# Patient Record
Sex: MALE | Race: WHITE | Employment: PART TIME | URBAN - METROPOLITAN AREA
[De-identification: names, ages, dates, MRNs, and addresses within clinical notes are randomized per-mention and may not be internally consistent; named-entity substitution may affect disease eponyms.]

---

## 2021-06-07 ENCOUNTER — HOSPITAL ENCOUNTER (EMERGENCY)
Age: 36
Discharge: HOME | End: 2021-06-07
Attending: EMERGENCY MEDICINE
Payer: MEDICAID

## 2021-06-07 VITALS
RESPIRATION RATE: 18 BRPM | TEMPERATURE: 98.2 F | SYSTOLIC BLOOD PRESSURE: 108 MMHG | HEIGHT: 66 IN | OXYGEN SATURATION: 98 % | DIASTOLIC BLOOD PRESSURE: 70 MMHG | WEIGHT: 280 LBS | BODY MASS INDEX: 45 KG/M2 | HEART RATE: 77 BPM

## 2021-06-07 DIAGNOSIS — T40.601A NARCOTIC OVERDOSE, ACCIDENTAL OR UNINTENTIONAL, INITIAL ENCOUNTER (HCC): Primary | ICD-10-CM

## 2021-06-07 DIAGNOSIS — T40.601A OPIATE OVERDOSE, ACCIDENTAL OR UNINTENTIONAL, INITIAL ENCOUNTER (HCC): ICD-10-CM

## 2021-06-07 PROCEDURE — 99285 EMERGENCY DEPT VISIT HI MDM: CPT
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
## 2021-06-07 NOTE — ED NOTES
Bed: 20  Expected date:   Expected time:   Means of arrival:   Comments:     Harsh Spears RN  06/07/21 1900

## 2021-06-08 NOTE — ED NOTES
Patient is alert and oriented at this time, watching TV, instructed to pull IV out per Dr Erick Hyman, RN  06/07/21 7675

## 2021-06-08 NOTE — ED NOTES
Pt awake alert skin pink w/d resp non labored. Gait steady on leaving er. Pt looking at phone on the way out of ER. Miranda Francois RN  06/07/21 5341

## 2021-07-31 ENCOUNTER — HOSPITAL ENCOUNTER (EMERGENCY)
Age: 36
Discharge: HOME | End: 2021-07-31
Attending: EMERGENCY MEDICINE
Payer: MEDICAID

## 2021-07-31 VITALS
WEIGHT: 200 LBS | DIASTOLIC BLOOD PRESSURE: 66 MMHG | SYSTOLIC BLOOD PRESSURE: 112 MMHG | RESPIRATION RATE: 16 BRPM | HEIGHT: 66 IN | HEART RATE: 91 BPM | OXYGEN SATURATION: 100 % | BODY MASS INDEX: 32.14 KG/M2 | TEMPERATURE: 98.2 F
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
## 2021-07-31 PROCEDURE — 36415 COLL VENOUS BLD VENIPUNCTURE: CPT

## 2021-07-31 PROCEDURE — 2500000003 HC RX 250 WO HCPCS: Performed by: EMERGENCY MEDICINE

## 2021-07-31 PROCEDURE — 99285 EMERGENCY DEPT VISIT HI MDM: CPT

## 2021-07-31 PROCEDURE — 6370000000 HC RX 637 (ALT 250 FOR IP): Performed by: EMERGENCY MEDICINE

## 2021-07-31 PROCEDURE — 82077 ASSAY SPEC XCP UR&BREATH IA: CPT

## 2021-07-31 PROCEDURE — 6360000002 HC RX W HCPCS: Performed by: EMERGENCY MEDICINE

## 2021-07-31 PROCEDURE — 96374 THER/PROPH/DIAG INJ IV PUSH: CPT

## 2021-07-31 RX ORDER — PROPARACAINE HYDROCHLORIDE 5 MG/ML
2 SOLUTION/ DROPS OPHTHALMIC ONCE
Status: COMPLETED | OUTPATIENT
Start: 2021-07-31 | End: 2021-07-31

## 2021-07-31 RX ORDER — KETOROLAC TROMETHAMINE 30 MG/ML
30 INJECTION, SOLUTION INTRAMUSCULAR; INTRAVENOUS ONCE
Status: COMPLETED | OUTPATIENT
Start: 2021-07-31 | End: 2021-07-31

## 2021-07-31 RX ADMIN — FLUORESCEIN SODIUM 1 MG: 1 STRIP OPHTHALMIC at 22:14

## 2021-07-31 RX ADMIN — KETOROLAC TROMETHAMINE 30 MG: 30 INJECTION, SOLUTION INTRAMUSCULAR; INTRAVENOUS at 22:19

## 2021-07-31 RX ADMIN — PROPARACAINE HYDROCHLORIDE 2 DROP: 5 SOLUTION/ DROPS OPHTHALMIC at 22:14

## 2021-07-31 ASSESSMENT — PAIN DESCRIPTION - DESCRIPTORS: DESCRIPTORS: ACHING

## 2021-07-31 ASSESSMENT — ENCOUNTER SYMPTOMS
ABDOMINAL DISTENTION: 0
EYE DISCHARGE: 0
SORE THROAT: 0
PHOTOPHOBIA: 0
COUGH: 0
ABDOMINAL PAIN: 0
VOMITING: 0
CHEST TIGHTNESS: 0
SHORTNESS OF BREATH: 0
WHEEZING: 0

## 2021-07-31 ASSESSMENT — PAIN DESCRIPTION - LOCATION: LOCATION: HEAD

## 2021-07-31 ASSESSMENT — PAIN DESCRIPTION - FREQUENCY: FREQUENCY: CONTINUOUS

## 2021-07-31 ASSESSMENT — PAIN DESCRIPTION - PAIN TYPE: TYPE: ACUTE PAIN

## 2021-07-31 ASSESSMENT — PAIN SCALES - GENERAL
PAINLEVEL_OUTOF10: 7
PAINLEVEL_OUTOF10: 7

## 2021-08-01 LAB
ETHANOL PERCENT: NORMAL G/DL
ETHANOL: <10 MG/DL (ref 0–0.08)
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
## 2022-03-15 ENCOUNTER — APPOINTMENT (OUTPATIENT)
Dept: GENERAL RADIOLOGY | Age: 37
DRG: 816 | End: 2022-03-15
Payer: MEDICAID

## 2022-03-15 ENCOUNTER — HOSPITAL ENCOUNTER (INPATIENT)
Age: 37
LOS: 1 days | Discharge: LEFT BEFORE DISCHARGE | DRG: 816 | End: 2022-03-16
Attending: STUDENT IN AN ORGANIZED HEALTH CARE EDUCATION/TRAINING PROGRAM | Admitting: INTERNAL MEDICINE
Payer: MEDICAID

## 2022-03-15 ENCOUNTER — APPOINTMENT (OUTPATIENT)
Dept: CT IMAGING | Age: 37
DRG: 816 | End: 2022-03-15
Payer: MEDICAID

## 2022-03-15 DIAGNOSIS — R41.82 ALTERED MENTAL STATUS, UNSPECIFIED ALTERED MENTAL STATUS TYPE: ICD-10-CM

## 2022-03-15 DIAGNOSIS — T40.2X4A OPIOID OVERDOSE, UNDETERMINED INTENT, INITIAL ENCOUNTER (HCC): Primary | ICD-10-CM

## 2022-03-15 LAB
ALBUMIN SERPL-MCNC: 4.2 G/DL (ref 3.5–4.6)
ALP BLD-CCNC: 97 U/L (ref 35–104)
ALT SERPL-CCNC: 18 U/L (ref 0–41)
ANION GAP SERPL CALCULATED.3IONS-SCNC: 14 MEQ/L (ref 9–15)
AST SERPL-CCNC: 28 U/L (ref 0–40)
BASOPHILS ABSOLUTE: 0.1 K/UL (ref 0–0.2)
BASOPHILS RELATIVE PERCENT: 0.6 %
BILIRUB SERPL-MCNC: <0.2 MG/DL (ref 0.2–0.7)
BUN BLDV-MCNC: 11 MG/DL (ref 6–20)
CALCIUM SERPL-MCNC: 9.4 MG/DL (ref 8.5–9.9)
CHLORIDE BLD-SCNC: 102 MEQ/L (ref 95–107)
CHP ED QC CHECK: NORMAL
CO2: 24 MEQ/L (ref 20–31)
CREAT SERPL-MCNC: 1.12 MG/DL (ref 0.7–1.2)
EOSINOPHILS ABSOLUTE: 0.4 K/UL (ref 0–0.7)
EOSINOPHILS RELATIVE PERCENT: 2.8 %
ETHANOL PERCENT: NORMAL G/DL
ETHANOL: <10 MG/DL (ref 0–0.08)
GFR AFRICAN AMERICAN: >60
GFR NON-AFRICAN AMERICAN: >60
GLOBULIN: 3.2 G/DL (ref 2.3–3.5)
GLUCOSE BLD-MCNC: 205 MG/DL (ref 70–99)
GLUCOSE BLD-MCNC: 259 MG/DL
HCT VFR BLD CALC: 38.7 % (ref 42–52)
HEMOGLOBIN: 12.7 G/DL (ref 14–18)
LACTIC ACID: 3.4 MMOL/L (ref 0.5–2.2)
LYMPHOCYTES ABSOLUTE: 1.5 K/UL (ref 1–4.8)
LYMPHOCYTES RELATIVE PERCENT: 11 %
MAGNESIUM: 2.4 MG/DL (ref 1.7–2.4)
MCH RBC QN AUTO: 27.3 PG (ref 27–31.3)
MCHC RBC AUTO-ENTMCNC: 32.7 % (ref 33–37)
MCV RBC AUTO: 83.5 FL (ref 80–100)
MONOCYTES ABSOLUTE: 0.5 K/UL (ref 0.2–0.8)
MONOCYTES RELATIVE PERCENT: 4 %
NEUTROPHILS ABSOLUTE: 10.9 K/UL (ref 1.4–6.5)
NEUTROPHILS RELATIVE PERCENT: 81.6 %
PDW BLD-RTO: 16.1 % (ref 11.5–14.5)
PLATELET # BLD: 360 K/UL (ref 130–400)
POTASSIUM SERPL-SCNC: 4.7 MEQ/L (ref 3.4–4.9)
RBC # BLD: 4.64 M/UL (ref 4.7–6.1)
SODIUM BLD-SCNC: 140 MEQ/L (ref 135–144)
TOTAL CK: 155 U/L (ref 0–190)
TOTAL PROTEIN: 7.4 G/DL (ref 6.3–8)
TROPONIN: <0.01 NG/ML (ref 0–0.01)
TSH REFLEX: 1.58 UIU/ML (ref 0.44–3.86)
WBC # BLD: 13.4 K/UL (ref 4.8–10.8)

## 2022-03-15 PROCEDURE — 99284 EMERGENCY DEPT VISIT MOD MDM: CPT

## 2022-03-15 PROCEDURE — 83735 ASSAY OF MAGNESIUM: CPT

## 2022-03-15 PROCEDURE — 2580000003 HC RX 258: Performed by: STUDENT IN AN ORGANIZED HEALTH CARE EDUCATION/TRAINING PROGRAM

## 2022-03-15 PROCEDURE — 6360000002 HC RX W HCPCS: Performed by: STUDENT IN AN ORGANIZED HEALTH CARE EDUCATION/TRAINING PROGRAM

## 2022-03-15 PROCEDURE — 6360000002 HC RX W HCPCS

## 2022-03-15 PROCEDURE — 85025 COMPLETE CBC W/AUTO DIFF WBC: CPT

## 2022-03-15 PROCEDURE — 84443 ASSAY THYROID STIM HORMONE: CPT

## 2022-03-15 PROCEDURE — 82077 ASSAY SPEC XCP UR&BREATH IA: CPT

## 2022-03-15 PROCEDURE — 82550 ASSAY OF CK (CPK): CPT

## 2022-03-15 PROCEDURE — 80053 COMPREHEN METABOLIC PANEL: CPT

## 2022-03-15 PROCEDURE — 70450 CT HEAD/BRAIN W/O DYE: CPT

## 2022-03-15 PROCEDURE — 83605 ASSAY OF LACTIC ACID: CPT

## 2022-03-15 PROCEDURE — 93005 ELECTROCARDIOGRAM TRACING: CPT | Performed by: STUDENT IN AN ORGANIZED HEALTH CARE EDUCATION/TRAINING PROGRAM

## 2022-03-15 PROCEDURE — 96365 THER/PROPH/DIAG IV INF INIT: CPT

## 2022-03-15 PROCEDURE — 36415 COLL VENOUS BLD VENIPUNCTURE: CPT

## 2022-03-15 PROCEDURE — 71045 X-RAY EXAM CHEST 1 VIEW: CPT

## 2022-03-15 PROCEDURE — 84484 ASSAY OF TROPONIN QUANT: CPT

## 2022-03-15 PROCEDURE — 96366 THER/PROPH/DIAG IV INF ADDON: CPT

## 2022-03-15 PROCEDURE — 80307 DRUG TEST PRSMV CHEM ANLYZR: CPT

## 2022-03-15 PROCEDURE — 2580000003 HC RX 258

## 2022-03-15 PROCEDURE — 81001 URINALYSIS AUTO W/SCOPE: CPT

## 2022-03-15 RX ORDER — NALOXONE HYDROCHLORIDE 1 MG/ML
INJECTION INTRAMUSCULAR; INTRAVENOUS; SUBCUTANEOUS
Status: COMPLETED
Start: 2022-03-15 | End: 2022-03-15

## 2022-03-15 RX ORDER — SODIUM CHLORIDE 9 MG/ML
INJECTION, SOLUTION INTRAVENOUS
Status: COMPLETED
Start: 2022-03-15 | End: 2022-03-16

## 2022-03-15 RX ADMIN — Medication 1000 ML: at 20:57

## 2022-03-15 RX ADMIN — NALOXONE HYDROCHLORIDE 2 MG: 1 INJECTION PARENTERAL at 20:56

## 2022-03-15 RX ADMIN — NALOXONE HYDROCHLORIDE 2 MG: 1 INJECTION PARENTERAL at 21:11

## 2022-03-15 RX ADMIN — SODIUM CHLORIDE 1000 ML: 9 INJECTION, SOLUTION INTRAVENOUS at 20:57

## 2022-03-15 RX ADMIN — NALOXONE HYDROCHLORIDE 2 MG/HR: 1 INJECTION PARENTERAL at 20:55

## 2022-03-16 VITALS
TEMPERATURE: 98.2 F | HEIGHT: 66 IN | SYSTOLIC BLOOD PRESSURE: 125 MMHG | OXYGEN SATURATION: 99 % | DIASTOLIC BLOOD PRESSURE: 84 MMHG | WEIGHT: 191 LBS | HEART RATE: 86 BPM | BODY MASS INDEX: 30.7 KG/M2 | RESPIRATION RATE: 15 BRPM

## 2022-03-16 PROBLEM — T40.601S OVERDOSE OF OPIATE OR RELATED NARCOTIC, ACCIDENTAL OR UNINTENTIONAL, SEQUELA: Status: ACTIVE | Noted: 2022-03-16

## 2022-03-16 LAB
AMPHETAMINE SCREEN, URINE: ABNORMAL
BACTERIA: ABNORMAL /HPF
BARBITURATE SCREEN URINE: ABNORMAL
BENZODIAZEPINE SCREEN, URINE: POSITIVE
BILIRUBIN URINE: NEGATIVE
BLOOD, URINE: NEGATIVE
CANNABINOID SCREEN URINE: POSITIVE
CLARITY: CLEAR
COCAINE METABOLITE SCREEN URINE: ABNORMAL
COLOR: YELLOW
EKG ATRIAL RATE: 115 BPM
EKG P AXIS: 61 DEGREES
EKG P-R INTERVAL: 122 MS
EKG Q-T INTERVAL: 348 MS
EKG QRS DURATION: 100 MS
EKG QTC CALCULATION (BAZETT): 481 MS
EKG R AXIS: 24 DEGREES
EKG T AXIS: -42 DEGREES
EKG VENTRICULAR RATE: 115 BPM
EPITHELIAL CELLS, UA: ABNORMAL /HPF (ref 0–5)
GLUCOSE BLD-MCNC: 145 MG/DL (ref 70–99)
GLUCOSE BLD-MCNC: 263 MG/DL (ref 70–99)
GLUCOSE BLD-MCNC: 303 MG/DL (ref 70–99)
GLUCOSE URINE: NEGATIVE MG/DL
HBA1C MFR BLD: 5.2 % (ref 4.8–5.9)
HYALINE CASTS: ABNORMAL /HPF (ref 0–5)
KETONES, URINE: NEGATIVE MG/DL
LACTIC ACID: 0.7 MMOL/L (ref 0.5–2.2)
LACTIC ACID: 1.2 MMOL/L (ref 0.5–2.2)
LEUKOCYTE ESTERASE, URINE: ABNORMAL
LIPASE: 25 U/L (ref 12–95)
Lab: ABNORMAL
METHADONE SCREEN, URINE: ABNORMAL
NITRITE, URINE: POSITIVE
OPIATE SCREEN URINE: POSITIVE
OXYCODONE URINE: ABNORMAL
PERFORMED ON: ABNORMAL
PH UA: 8.5 (ref 5–9)
PHENCYCLIDINE SCREEN URINE: ABNORMAL
PROPOXYPHENE SCREEN: ABNORMAL
PROTEIN UA: 30 MG/DL
RBC UA: ABNORMAL /HPF (ref 0–5)
SPECIFIC GRAVITY UA: 1.02 (ref 1–1.03)
TROPONIN: <0.01 NG/ML (ref 0–0.01)
TROPONIN: <0.01 NG/ML (ref 0–0.01)
URINE REFLEX TO CULTURE: YES
UROBILINOGEN, URINE: 1 E.U./DL
WBC UA: ABNORMAL /HPF (ref 0–5)

## 2022-03-16 PROCEDURE — 96367 TX/PROPH/DG ADDL SEQ IV INF: CPT

## 2022-03-16 PROCEDURE — 87086 URINE CULTURE/COLONY COUNT: CPT

## 2022-03-16 PROCEDURE — 6370000000 HC RX 637 (ALT 250 FOR IP): Performed by: STUDENT IN AN ORGANIZED HEALTH CARE EDUCATION/TRAINING PROGRAM

## 2022-03-16 PROCEDURE — 97162 PT EVAL MOD COMPLEX 30 MIN: CPT

## 2022-03-16 PROCEDURE — 83036 HEMOGLOBIN GLYCOSYLATED A1C: CPT

## 2022-03-16 PROCEDURE — 2580000003 HC RX 258: Performed by: INTERNAL MEDICINE

## 2022-03-16 PROCEDURE — 83690 ASSAY OF LIPASE: CPT

## 2022-03-16 PROCEDURE — 86403 PARTICLE AGGLUT ANTBDY SCRN: CPT

## 2022-03-16 PROCEDURE — 6360000002 HC RX W HCPCS: Performed by: INTERNAL MEDICINE

## 2022-03-16 PROCEDURE — G0378 HOSPITAL OBSERVATION PER HR: HCPCS

## 2022-03-16 PROCEDURE — 97166 OT EVAL MOD COMPLEX 45 MIN: CPT

## 2022-03-16 PROCEDURE — 83605 ASSAY OF LACTIC ACID: CPT

## 2022-03-16 PROCEDURE — 96361 HYDRATE IV INFUSION ADD-ON: CPT

## 2022-03-16 PROCEDURE — 1210000000 HC MED SURG R&B

## 2022-03-16 PROCEDURE — 36415 COLL VENOUS BLD VENIPUNCTURE: CPT

## 2022-03-16 PROCEDURE — 84484 ASSAY OF TROPONIN QUANT: CPT

## 2022-03-16 RX ORDER — HYDROXYZINE PAMOATE 50 MG/1
50 CAPSULE ORAL EVERY 8 HOURS PRN
Status: DISCONTINUED | OUTPATIENT
Start: 2022-03-16 | End: 2022-03-17 | Stop reason: HOSPADM

## 2022-03-16 RX ORDER — SODIUM CHLORIDE 9 MG/ML
INJECTION, SOLUTION INTRAVENOUS CONTINUOUS
Status: DISCONTINUED | OUTPATIENT
Start: 2022-03-16 | End: 2022-03-17 | Stop reason: HOSPADM

## 2022-03-16 RX ORDER — DICYCLOMINE HCL 20 MG
20 TABLET ORAL EVERY 6 HOURS PRN
Status: DISCONTINUED | OUTPATIENT
Start: 2022-03-16 | End: 2022-03-17 | Stop reason: HOSPADM

## 2022-03-16 RX ORDER — DEXTROSE MONOHYDRATE 25 G/50ML
12.5 INJECTION, SOLUTION INTRAVENOUS PRN
Status: DISCONTINUED | OUTPATIENT
Start: 2022-03-16 | End: 2022-03-17 | Stop reason: HOSPADM

## 2022-03-16 RX ORDER — IBUPROFEN 800 MG/1
800 TABLET ORAL EVERY 8 HOURS PRN
Status: DISCONTINUED | OUTPATIENT
Start: 2022-03-16 | End: 2022-03-17 | Stop reason: HOSPADM

## 2022-03-16 RX ORDER — 0.9 % SODIUM CHLORIDE 0.9 %
500 INTRAVENOUS SOLUTION INTRAVENOUS ONCE
Status: COMPLETED | OUTPATIENT
Start: 2022-03-16 | End: 2022-03-16

## 2022-03-16 RX ORDER — MIRTAZAPINE 15 MG/1
15 TABLET, FILM COATED ORAL NIGHTLY
COMMUNITY

## 2022-03-16 RX ORDER — DEXTROSE MONOHYDRATE 50 MG/ML
100 INJECTION, SOLUTION INTRAVENOUS PRN
Status: DISCONTINUED | OUTPATIENT
Start: 2022-03-16 | End: 2022-03-17 | Stop reason: HOSPADM

## 2022-03-16 RX ORDER — LANOLIN ALCOHOL/MO/W.PET/CERES
3 CREAM (GRAM) TOPICAL NIGHTLY
Status: DISCONTINUED | OUTPATIENT
Start: 2022-03-16 | End: 2022-03-17 | Stop reason: HOSPADM

## 2022-03-16 RX ORDER — CEPHALEXIN 500 MG/1
500 CAPSULE ORAL ONCE
Status: COMPLETED | OUTPATIENT
Start: 2022-03-16 | End: 2022-03-16

## 2022-03-16 RX ORDER — MIRTAZAPINE 15 MG/1
15 TABLET, FILM COATED ORAL NIGHTLY
Status: DISCONTINUED | OUTPATIENT
Start: 2022-03-16 | End: 2022-03-17 | Stop reason: HOSPADM

## 2022-03-16 RX ORDER — NICOTINE POLACRILEX 4 MG
15 LOZENGE BUCCAL PRN
Status: DISCONTINUED | OUTPATIENT
Start: 2022-03-16 | End: 2022-03-17 | Stop reason: HOSPADM

## 2022-03-16 RX ORDER — PROMETHAZINE HYDROCHLORIDE 12.5 MG/1
25 TABLET ORAL EVERY 6 HOURS PRN
Status: DISCONTINUED | OUTPATIENT
Start: 2022-03-16 | End: 2022-03-17 | Stop reason: HOSPADM

## 2022-03-16 RX ORDER — 0.9 % SODIUM CHLORIDE 0.9 %
1000 INTRAVENOUS SOLUTION INTRAVENOUS ONCE
Status: COMPLETED | OUTPATIENT
Start: 2022-03-16 | End: 2022-03-16

## 2022-03-16 RX ORDER — CLONIDINE HYDROCHLORIDE 0.1 MG/1
0.1 TABLET ORAL PRN
Status: DISCONTINUED | OUTPATIENT
Start: 2022-03-16 | End: 2022-03-17 | Stop reason: HOSPADM

## 2022-03-16 RX ORDER — TRAMADOL HYDROCHLORIDE 50 MG/1
50 TABLET ORAL PRN
Status: DISCONTINUED | OUTPATIENT
Start: 2022-03-16 | End: 2022-03-17 | Stop reason: HOSPADM

## 2022-03-16 RX ADMIN — CEFTRIAXONE SODIUM 1000 MG: 1 INJECTION, POWDER, FOR SOLUTION INTRAMUSCULAR; INTRAVENOUS at 12:06

## 2022-03-16 RX ADMIN — CEPHALEXIN 500 MG: 500 CAPSULE ORAL at 07:12

## 2022-03-16 RX ADMIN — SODIUM CHLORIDE 500 ML: 9 INJECTION, SOLUTION INTRAVENOUS at 10:34

## 2022-03-16 RX ADMIN — SODIUM CHLORIDE: 9 INJECTION, SOLUTION INTRAVENOUS at 13:00

## 2022-03-16 ASSESSMENT — PAIN SCALES - GENERAL
PAINLEVEL_OUTOF10: 7
PAINLEVEL_OUTOF10: 7

## 2022-03-16 ASSESSMENT — PAIN DESCRIPTION - LOCATION
LOCATION: LEG
LOCATION: GENERALIZED

## 2022-03-16 ASSESSMENT — PAIN DESCRIPTION - PAIN TYPE
TYPE: ACUTE PAIN
TYPE: ACUTE PAIN

## 2022-03-16 NOTE — ED NOTES
2032 hours 4 mg zofran given 2 mg narcan given  2037 hours 2 mg narcan given  2039 hours 2 mg narcan given      Morgan Schroeder RN  03/15/22 2039

## 2022-03-16 NOTE — PROGRESS NOTES
Physical Therapy   Facility/Department: [facility] MED SURG L206/[id number]    NAME: Ashly Stacy    : 1985 (40 y.o.)  MRN: [id number]    Account: [de-identified]  Gender: male    PT evaluation and treatment orders received. Chart reviewed. PT eval attempted. Patient declined to participate in PT eval at this time. Richard Treadwell RN present in room during pt refusal. Pt ed in role of acute PT. Pt states \"Not right now. \" Pt amenable to attempt later this date. Will attempt PT evaluation again at earliest convenience.       Electronically signed by Vijay Ramirez PT on 3/16/22 at 11:03 AM EDT

## 2022-03-16 NOTE — ED NOTES
Pt up on side of bed with assistance from this RN and tech [address] Pt unable to hold weight on his own. Pt states he can't take a couple steps because his legs hurt. MD made aware.       Al Tran RN  03/16/22 5576

## 2022-03-16 NOTE — ED NOTES
Pt looking around and attempting to talk. Slurred speech and unable to understand what patient is trying to say. Pt looking around at times.        Jaime Eaton RN  03/15/22 8758

## 2022-03-16 NOTE — FLOWSHEET NOTE
Pt up to floor from ER, VSS slightly hypotensive, pt sleepy buy rousable, stated he wants to eat, msg to , rosalee precx in place cont to monitor Electronically signed by Katja Escobar RN on 3/16/2022 at 8:32 AM   Pt did eat bkfst, fluid bolus infusing, pt resp 15 spo2 97% on RA, pt refused his lovenox shot and just refused to get up with therapy, was tearful talking about just getting out of rehab, rosalee prex in place cont to monitor Electronically signed by Katja Escobar RN on 3/16/2022 at 10:44 AM  Pt lethargic but wakes to voice, answers basic questions, bp rechk after bolus 113/66, lunchtray at bedside per pt \"not right now\", did wake up and sign financial forms with , rosalee precx in place, tele in place cont to monitor Electronically signed by Katja Escobar RN on 3/16/2022 at 1:07 PM   Pt got up to walk to bathroom to urinate, more alert to answer and ask questions since this a.m. rosalee precx in place cont to monitor Electronically signed by Katja Escobar RN on 3/16/2022 at 5:35 PM  Talked with pt re; if he wanted anyone notified that he was here, said \"not right now\", pt unable to answer questions re; details surrounding overdose, didn't know if he was home or at a friends house, manager checking w/security re; cell phone and keys (not with pt belongings) Electronically signed by Katja Escobar RN on 3/16/2022 at 5:37 PM   Pt much more awake alert oriented, ambulated to bathroom independently with steady gait, pt tearful saying \"I Yale Sensor stop using\" but I also Xavier Weiss get out of here and get high\", informed pt that  was in to see him earlier but he was not alert enough to hold conversation, emotional support/encouragemnt given and encouraged pt to discuss this with  when he evaluates, call light in reach, cont to monitor Electronically signed by Katja Escobar RN on 3/16/2022 at 6:28 PM

## 2022-03-16 NOTE — PLAN OF CARE
See OT evaluation for all goals and OT POC.  Electronically signed by TACOS Lynn/TRISTIN on 3/16/2022 at 2:53 PM

## 2022-03-16 NOTE — PROGRESS NOTES
[facility] Occupational Therapy      Date: 3/16/2022  Patient Name: Xavier Mcmahon        MRN: [id number]  Account: [de-identified]   : 1985  (40 y.o.)  [facility]    Chart reviewed, attempted OT at [phone] for eval. Patient not seen 2° to:    Pt. declined, stating: \"Not right now\". RN present and encourages pt. but he continues to decline; amenable to attempting later this date. Spoke to [facility], RN aware. Will attempt again when able.     Electronically signed by MICKY Perry on 3/16/2022 at 11:03 AM

## 2022-03-16 NOTE — PROGRESS NOTES
Follow-up on the patient. Patient reported having body ache and discomfort. Nausea but no vomiting. Patient admits that he snorts heroin. He denies any intravenous drug use. He reported having depression but denies any suicidal ideation. Reported having chest wall pain. Reported having epigastric pain. ROS: 12 system review otherwise is negative for acute signs or symptoms over the last 24 hrs. Exam: Awake, alert oriented, in mild distress. HEENT: Pink conjunctiva and buccal mucosa. Normal and throat and nose  Neck: Supple, no nuchal rigidity. Endo: No thyromegaly. Vascular: No JVD or carotid bruit. Chest: Clear to auscultation, no dullness to percussion. Heart: Regular rate and rhythm, no extra sounds. No murmur, no rub. Abdomen: Soft, mild epigastric tenderness. LE: No cyanosis or clubbing, no varices or edema. Neuro: Awake, alert, oriented, normal speech, normal comprehension, normal extension, normal cranial nerves, normal and symmetrical motor and tone examination throughout. MS: No joint effusion or tenderness. Skin: No skin rash, itching, bruising or significant findings. Assessment and plan:     *Heroin overdose. *Multidrug use including benzo, opiates and cannabinoids    *Depression but no suicidal ideation. *UTI.    *Hyperglycemia. No history of diabetes. *Multiple other nonspecific symptoms. Continue to monitor. Plan:  Telemetry monitoring. I defer that to him. Intravenous antibiotic. Urine culture. Monitor electrolytes, kidney function, WBC and hemoglobin. Check hemoglobin A1c and started the patient on sliding scale. Monitor his CNS and respiratory status. Psychiatric evaluation. Arrange for drug rehabilitation program.    Patient's status is dynamic and evolutionary therefore the aforementioned assessment and plan may or may not be complete we will consider.   The patient would likely need to have additional work-up, investigation and therapeutic intervention that will be determined based on the clinical progression and follow-up test result.

## 2022-03-16 NOTE — PROGRESS NOTES
[facility] OCCUPATIONAL THERAPY EVALUATION - ACUTE     NAME: Xavier Mcmahon  : 1985 (40 y.o.)  MRN: [id number]  CODE STATUS: Full Code  Room: [place] [id number]    Date of Service: 3/16/2022    Patient Diagnosis(es): Overdose of opiate or related narcotic, accidental or unintentional, sequela [T40.601S]  Opioid overdose, undetermined intent, initial encounter ([facility] [T40.2X4A]  Altered mental status, unspecified altered mental status type [R41.82]   Chief Complaint   Patient presents with    Drug Overdose     Patient Active Problem List    Diagnosis Date Noted    Overdose of opiate or related narcotic, accidental or unintentional, sequela 2022        History reviewed. No pertinent past medical history. History reviewed. No pertinent surgical history. Restrictions  Restrictions/Precautions: Fall Risk     Safety Devices: Safety Devices  Safety Devices in place: Yes  Type of devices:  All fall risk precautions in place        Subjective  Pre Treatment Pain Screening  Pain at present: 7  Scale Used: Numeric Score  Intervention List: Patient able to continue with treatment,Patient declined any intervention    Pain Reassessment:   Pain Assessment  Patient Currently in Pain: Yes  Pain Assessment: 0-10  Pain Level: 7  Pain Type: Acute pain  Pain Location: Generalized     Prior Level of Function:  Social/Functional History  Lives With: Alone  Type of Home: House  Home Layout: One level,Laundry in basement (15 steps to basement)  Home Access: Stairs to enter with rails  Entrance Stairs - Number of Steps: 4  Bathroom Shower/Tub: Tub/Shower unit  ADL Assistance: Independent  Homemaking Assistance: Independent  Ambulation Assistance: Independent (No AD)  Transfer Assistance: Independent  Active : No  Patient's  Info: Walks, parents can assist in the summer but leave the state for the winter  Occupation: Full time employment  Type of occupation: [employer]    OBJECTIVE:     Orientation Status:  Orientation  Overall Orientation Status: Impaired (Pt. oriented however when returning to bed asks 'why am I here? \" and does not recall situation)  Orientation Level: Oriented to time,Oriented to person,Oriented to place    Observation:  Observation/Palpation  Observation: Lethargic, answers slowly with increased time and encouragement    Cognition Status:  Cognition  Overall Cognitive Status: Exceptions  Arousal/Alertness: Delayed responses to stimuli  Following Commands:  Follows one step commands consistently  Attention Span: Difficulty attending to directions,Difficulty dividing attention  Memory: Decreased recall of precautions,Decreased short term memory,Decreased long term memory,Decreased recall of recent events  Safety Judgement: Decreased awareness of need for assistance,Decreased awareness of need for safety  Problem Solving: Assistance required to generate solutions,Assistance required to identify errors made,Assistance required to implement solutions,Assistance required to correct errors made  Insights: Decreased awareness of deficits  Initiation: Requires cues for some  Sequencing: Requires cues for some  Cognition Comment: Increased processing time, decreased safety awareness    Perception Status:  Perception  Overall Perceptual Status: WFL    Sensation Status:  Sensation  Overall Sensation Status: Impaired  Additional Comments: Numbness/tingling in legs/feet    Vision and Hearing Status:  Vision  Vision: Within Functional Limits  Hearing  Hearing: Within functional limits     ROM:   LUE AROM (degrees)  LUE AROM : WFL  Left Hand AROM (degrees)  Left Hand AROM: WFL  RUE AROM (degrees)  RUE AROM : WFL  Right Hand AROM (degrees)  Right Hand AROM: WFL    Strength:  LUE Strength  Gross LUE Strength: Exceptions to [facility]  L Hand General: 3+/5  LUE Strength Comment: 3+/5 all planes  RUE Strength  Gross RUE Strength: Exceptions to [facility]  R Hand General: 3+/5  RUE Strength Comment: 3+/5 all planes    Coordination, Tone, Quality of Movement: Tone RUE  RUE Tone: Normotonic  Tone LUE  LUE Tone: Normotonic  Coordination  Movements Are Fluid And Coordinated: No  Coordination and Movement description: Fine motor impairments,Decreased speed,Decreased accuracy,Right UE,Left UE    Hand Dominance:  Hand Dominance  Hand Dominance: Right    ADL Status:  ADL  Feeding: Setup  Grooming: Setup  UE Bathing: Stand by assistance  LE Bathing: Stand by assistance  UE Dressing: Stand by assistance  LE Dressing: Stand by assistance  Toileting: Stand by assistance  Additional Comments: Simulated ADLs as above. Close SBA; pt. with poor awareness of mobility, encouragement to attempt tasks. Able to reach feet  Toilet Transfers  Toilet Transfer: Unable to assess  Toilet Transfers Comments: Declined need, anticipate SBA     Therapy key for assistance levels -   Independent = Pt. is able to perform task with no assistance but may require a device   Stand by assistance = Pt. does not perform task at an independent level but does not need physical assistance, requires verbal cues  Minimal, Moderate, Maximal Assistance = Pt. requires physical assistance (25%, 50%, 75% assist from helper) for task but is able to actively participate in task   Dependent = Pt. requires total assistance with task and is not able to actively participate with task completion     Functional Mobility:  Functional Mobility  Functional - Mobility Device: No device  Activity: To/from bathroom  Assist Level: Stand by assistance  Functional Mobility Comments: Verbal cues for safety and sequencing  Transfers  Sit to stand: Stand by assistance  Stand to sit: Stand by assistance  Transfer Comments: Verbal cues for safety and sequencing    Bed Mobility  Bed mobility  Rolling to Left: Independent  Rolling to Right: Independent  Supine to Sit: Supervision  Sit to Supine: Supervision  Comment: Requires encouragement.  Supervision required due to confusion and delay in following commands    Seated and Standing Balance:  Balance  Sitting Balance: Supervision  Standing Balance: Stand by assistance    Functional Endurance:  Activity Tolerance  Activity Tolerance: Patient limited by fatigue,Patient limited by pain    D/C Recommendations:  OT D/C RECOMMENDATIONS  REQUIRES OT FOLLOW UP: Yes    Equipment Recommendations:  OT Equipment Recommendations  Other: Continue to assess    OT Education:   OT Education  OT Education: [facility]  [facility]  Patient Education: Educated pt on role of acute care OT  Barriers to Learning: None    OT Follow Up:  OT D/C RECOMMENDATIONS  REQUIRES OT FOLLOW UP: Yes     Assessment/Discharge Disposition:  Assessment: Pt is a 40year old man from home who presents to [facility] with the above deficits s/p overdose. Pt. would benefit from continued OT to maximize independence and safety with ADL tasks.   Performance deficits / Impairments: Decreased functional mobility ,Decreased ADL status,Decreased strength,Decreased endurance,Decreased balance,Decreased high-level IADLs,Decreased cognition,Decreased fine motor control,Decreased coordination  Prognosis: Good  Discharge Recommendations: Continue to assess pending progress  Decision Making: Medium Complexity  History: Pt's medical history is moderately complex  Exam: Pt. has 9 performance deficits  Assistance / Modification: Pt. requires min A    Six Click Score   How much help for putting on and taking off regular lower body clothing?: A Little  How much help for Bathing?: A Little  How much help for Toileting?: A Little  How much help for putting on and taking off regular upper body clothing?: A Little  How much help for taking care of personal grooming?: A Little  How much help for eating meals?: A Little  AM-[facility] Inpatient Daily Activity Raw Score: 18  AM-PAC Inpatient ADL T-Scale Score : 38.66  ADL Inpatient CMS 0-100% Score: 46.65    Plan:  Plan  Times per week: 1-4x  Plan weeks: Length of acute stay  Current Treatment Recommendations: Strengthening,Balance Training,Functional Mobility Training,Endurance Training,Pain Management,Safety Education & Training,Cognitive Reorientation,Patient/Caregiver Education & Training,Equipment Evaluation, Education, & procurement,Self-Care / ADL,Cognitive/Perceptual Training    Goals:   Patient will:    - Improve functional endurance to tolerate/complete 30 mins of ADL's  - Be Mod I in UB ADLs   - Be Mod I in LB ADLs  - Be Mod I in ADL transfers without LOB  - Be Mod I in toileting tasks  - Improve B UE strength and endurance to 4/5 in order to participate in self-care activities as projected. - Access appropriate D/C site with as few architectural barriers as possible. - Sequence self-care tasks with no verbal cues for safety    Patient Goal: Patient goals : \"I want to get home\"     Discussed and agreed upon: Yes Comments:     Therapy Time:   OT Individual Minutes  Time In: 9300  Time Out: 1355  Minutes: 12    Eval: 12 minutes     Electronically signed by:     TACOS Anderson/L, OTR/L  3/16/2022, 2:51 PM

## 2022-03-16 NOTE — ED PROVIDER NOTES
[facility] ED  eMERGENCY dEPARTMENT eNCOUnter      Pt Name: Kavin Andres  MRN: [id number]  [place] 1985  Date of evaluation: 3/15/2022  Provider: Juan Thompson MD      HISTORY OF PRESENT ILLNESS      Chief Complaint   Patient presents with    Drug Overdose       The history is provided by the Patient. Kavin Andres is a 40 y.o. male with a PMH clinically significant for ELLEN with opioids presenting to the ED via EMS s/p OD with AMS. EMS stating the patient was found at his friend's home curled up in the fetal position and unresponsive. States that the friend administered 8 mg of Narcan IN prior to their arrival.  States that they then administered additional 2 mg IN and 2 mg IV prior to arrival in the ED. States patient was tachycardic and minimally responsive throughout transportation. Was satting normally on room air however. Placed IV. No further interventions in route. Patient minimally responsive, unable to contribute to the history at this time. Per Chart Review: Prior history of OD in 6/7/2021 appreciated. REVIEW OF SYSTEMS       Review of Systems   Unable to perform ROS: Patient unresponsive       PAST MEDICAL HISTORY   History reviewed. No pertinent past medical history. SURGICAL HISTORY     History reviewed. No pertinent surgical history. FAMILY HISTORY     History reviewed. No pertinent family history.     SOCIAL HISTORY       Social History     Socioeconomic History    Marital status: Single     Spouse name: None    Number of children: None    Years of education: None    Highest education level: None   Occupational History    None   Tobacco Use    Smoking status: Former Smoker    Smokeless tobacco: Never Used   Vaping Use    Vaping Use: Never used   Substance and Sexual Activity    Alcohol use: Yes     Comment: 7-10 drinks a few times a week    Drug use: Yes     Types: Marijuana (Delvin Gio), Opiates     Sexual activity: None   Other Topics Concern    None Social History Narrative    None     Social Determinants of Health     Financial Resource Strain:     Difficulty of Paying Living Expenses: Not on file   Food Insecurity:     Worried About Running Out of Food in the Last Year: Not on file    Ga of Food in the Last Year: Not on file   Transportation Needs:     Lack of Transportation (Medical): Not on file    Lack of Transportation (Non-Medical): Not on file   Physical Activity:     Days of Exercise per Week: Not on file    Minutes of Exercise per Session: Not on file   Stress:     Feeling of Stress : Not on file   Social Connections:     Frequency of Communication with Friends and Family: Not on file    Frequency of Social Gatherings with Friends and Family: Not on file    Attends Episcopal Services: Not on file    Active Member of [address] [place] [address] [employer] or Organizations: Not on file    Attends Club or Organization Meetings: Not on file    Marital Status: Not on file   Intimate Partner Violence:     Fear of Current or Ex-Partner: Not on file    Emotionally Abused: Not on file    Physically Abused: Not on file    Sexually Abused: Not on file   Housing Stability:     Unable to Pay for Housing in the Last Year: Not on file    Number of [place] in the Last Year: Not on file    Unstable Housing in the Last Year: Not on file       CURRENT MEDICATIONS       Previous Medications    No medications on file       ALLERGIES     Patient has no known allergies. PHYSICAL EXAM       ED Triage Vitals   BP Temp Temp Source Pulse Resp SpO2 Height Weight   03/15/22 2034 03/15/22 2048 03/15/22 2048 03/15/22 2032 03/15/22 2032 03/15/22 2032 -- --   (!) 109/97 98.6 °F (37 °C) Oral 121 22 100 %         Physical Exam  Vitals and nursing note reviewed. Constitutional:       General: He is in acute distress. Appearance: He is obese. He is ill-appearing and diaphoretic. HENT:      Head: Normocephalic and atraumatic.       Mouth/Throat:      Mouth: Mucous membranes are moist.      Pharynx: Oropharynx is clear. Eyes:      Conjunctiva/sclera: Conjunctivae normal.      Pupils: Pupils are equal, round, and reactive to light. Cardiovascular:      Rate and Rhythm: Regular rhythm. Tachycardia present. Pulses: Normal pulses. Heart sounds: Normal heart sounds. Pulmonary:      Breath sounds: Decreased air movement and transmitted upper airway sounds present. Comments: Breath sounds present bilaterally. Poor respiratory effort. Abdominal:      Palpations: Abdomen is soft. Tenderness: There is no abdominal tenderness. Musculoskeletal:         General: No deformity or signs of injury. Cervical back: Normal range of motion and neck supple. Right lower leg: No edema. Left lower leg: No edema. Skin:     Capillary Refill: Capillary refill takes less than 2 seconds. Coloration: Skin is pale. Neurological:      Mental Status: He is unresponsive. GCS: GCS eye subscore is 1. GCS verbal subscore is 2. GCS motor subscore is 5. Sensory: Sensation is intact. Comments: Only responsive to verbal stimuli. Will localize to pain in all 4 extremities.          MDM:   Chart Reviewed: PMH and additional information as noted in HPI obtained from chart review    Vitals:    Vitals:    03/16/22 0545 03/16/22 0600 03/16/22 0615 03/16/22 0630   BP:       Pulse: 90 79 84 83   Resp: 22 15 16 14   Temp:       TempSrc:       SpO2:           PROCEDURES:  Unless otherwise noted below, none  Procedures    LABS:  Labs Reviewed   COMPREHENSIVE METABOLIC PANEL - Abnormal; Notable for the following components:       Result Value    Glucose 205 (*)     All other components within normal limits   CBC WITH AUTO DIFFERENTIAL - Abnormal; Notable for the following components:    WBC 13.4 (*)     RBC 4.64 (*)     Hemoglobin 12.7 (*)     Hematocrit 38.7 (*)     MCHC 32.7 (*)     RDW 16.1 (*)     Neutrophils Absolute 10.9 (*)     All other components within normal limits   URINALYSIS WITH REFLEX TO CULTURE - Abnormal; Notable for the following components:    Protein, UA 30 (*)     Nitrite, Urine POSITIVE (*)     Leukocyte Esterase, Urine TRACE (*)     All other components within normal limits   URINE DRUG SCREEN - Abnormal; Notable for the following components:    Benzodiazepine Screen, Urine POSITIVE (*)     Cannabinoid Scrn, Ur POSITIVE (*)     Opiate Scrn, Ur POSITIVE (*)     All other components within normal limits   LACTIC ACID - Abnormal; Notable for the following components:    Lactic Acid 3.4 (*)     All other components within normal limits    Narrative:     CALL  [facility]  [facility] tel. [phone],  Lactic Acid results called to and read back by Dr Dimitri Hoyt, 03/15/2022 21:34, by  [place] [address] - Abnormal; Notable for the following components:    Bacteria, UA MANY (*)     WBC, UA 10-20 (*)     RBC, UA 3-5 (*)     All other components within normal limits   POCT GLUCOSE - Normal   CULTURE, URINE   MAGNESIUM   TROPONIN   TSH WITH REFLEX   CK   ETHANOL    Narrative:     CALL  [facility]  [facility] tel. [phone],  Lactic Acid results called to and read back by Dr Dimitri Hoyt, 03/15/2022 21:34, by  Foster Atkins   LACTIC ACID   LACTIC ACID       XR CHEST PORTABLE    (Results Pending)   CT HEAD WO CONTRAST    (Results Pending)       ED Course as of 03/16/22 0651   Tue Mar 15, 2022   2153 XR CHEST PORTABLE  No gross acute cardiopulmonary abnormalities. [NA]   2317 GLUCOSE, FASTING,GF(!): 205  Hyperglycemia in the setting of acute stress, otherwise largely unremarkable. [NA]   2317 Troponin: <0.010  WNL, low suspicion for ACS [NA]   2317 Magnesium: 2.4 [NA]   2317 TSH: 1.580 [NA]   2317 Ethanol Lvl: <10  Lower suspicion for EtOH contributing [NA]   2317 Total CK: 155 [NA]   2317 Lactic Acid(!!): 3.4  Mildly elevated lactic acidosis. [NA]   2317 WBC(!): 13.4  Leukocytosis in the setting of acute stress. Nonspecific. [NA]   2317 Hemoglobin Quant(!): 12.7  Mild anemia, nonspecific.  [NA] [facility]  Preliminary radiology read: No acute infarct, bleed, or acute intracranial abnormality. Moderate bilateral maxillary fluid or mucosal thickening. [NA]   2345 EKG 12 Lead - Chest Pain  EKG showing sinus tachycardia, rate of 115 bpm.  Normal axis, prolonged QTC, nonspecific ST-T wave abnormalities. [NA]   Wed Mar 16, 2022   0027 Lactic Acid: 0.7 [NA]   2818 Benzodiazepine Screen, Urine(!): POSITIVE [NA]   0628 Cannabinoid Scrn, Ur(!): POSITIVE [NA]   0628 Opiate Scrn, Ur(!): POSITIVE [NA]   0628 Nitrite, Urine(!): POSITIVE [NA]   0628 WBC, UA(!): 10-20 [NA]   0628 Bacteria, UA(!): MANY  Likely UTI [NA]      ED Course User Index  [NA] Faustino Orellana MD       40 y.o. male with a PMH clinically significant for ELLEN with opioids presenting to the ED via EMS s/p OD with AMS. Upon initial evaluation, Pt minimally responsive as noted above, requiring over 20 mg of Narcan total to improve mental status and respiratory status, eventually placed on Narcan drip. PE as noted above. Labs, , EKG, and Imaging as noted above. Given findings, clinical presentation most likely consistent w/ overdose on opioid narcotic with significant respiratory and CNS depression as noted above. Given patient's significant overdose, required Narcan drip in the ED. Eventually able to be weaned. Patient however still significantly intoxicated and unable to ambulate. Was able to tolerate p.o. intake. Patient only admitting to snorting Percocet 30s. Does not wish to stop using at this time. Was recently discharged from rehab. Attempted to ambulate the patient, but unsuccessful in the ED. Given findings, will admit to medicine for further PT/OT and evaluation given the patient's generalized weakness and continued intoxication in the ED.   Pt was administered   Medications   naloxone (NARCAN) 2 mg in sodium chloride 0.9 % 500 mL infusion (0 mg/hr IntraVENous Stopped 3/15/22 7205)   0.9 % sodium chloride bolus (has no administration in time range)   cephALEXin (KEFLEX) capsule 500 mg (has no administration in time range)   naloxone [facility]) 2 MG/2ML injection (2 mg  Given 3/15/22 2056)   sodium chloride 0.9 % infusion (0 mL/hr  Stopped 3/16/22 0132)   naloxone (NARCAN) 2 MG/2ML injection (2 mg  Given 3/15/22 2056)   naloxone [facility]) 2 MG/2ML injection (2 mg  Given 3/15/22 2111)       Plan: Admit to IM for further evaluation and management. Report given to Dr. Jin Varma. and Patient understanding and amenable to the POC. CRITICAL CARE TIME   Total CriticalCare time was 0 minutes, excluding separately reportable procedures. There was a high probability of clinically significant/life threatening deterioration in the patient's condition which required my urgent intervention. FINAL IMPRESSION      1. Opioid overdose, undetermined intent, initial encounter ([facility] [address]    2. Altered mental status, unspecified altered mental status type          DISPOSITION/PLAN   DISPOSITION        There are no discharge medications for this patient.        MD Brijesh Abreu MD  03/16/22 5623

## 2022-03-16 NOTE — ED TRIAGE NOTES
Pt brought by Eugene Sweeney from home for overdose. Per EMS patient was found laying in fetal position unresponsive. Pt was already given 8 mg nasal narcan by friend at the house and was still unresponsive. EMS gave 2 more mg nasal and 2 mg iv narcan and patient still unresponsive. Per ems patient had an episode of vomiting in the squad. Pt arrives unresponsive in fetal position. Dr Aristides Patel at the bedside. Pt arrives with snoring respirations, dried blood to nares and dried vomit on him.

## 2022-03-16 NOTE — PROGRESS NOTES
Physical Therapy Med Surg Initial Assessment  [facility]Department[facility]  [facility]       NAME: Rodolfo Swan  : 1985 (40 y.o.)  MRN: [id number]  CODE STATUS: Full Code    Date of Service: 3/16/2022    Patient Diagnosis(es): Overdose of opiate or related narcotic, accidental or unintentional, sequela [T40.601S]  Opioid overdose, undetermined intent, initial encounter ([facility] [T40.2X4A]  Altered mental status, unspecified altered mental status type [R41.82]   Chief Complaint   Patient presents with    Drug Overdose     Patient Active Problem List    Diagnosis Date Noted    Overdose of opiate or related narcotic, accidental or unintentional, sequela 2022        History reviewed. No pertinent past medical history. History reviewed. No pertinent surgical history.     Chart Reviewed: Yes  Patient assessed for rehabilitation services?: Yes  Family / Caregiver Present: No  General Comment  Comments: Pt resting in bed, agreeable to PT eval with encouragement    Restrictions:  Restrictions/Precautions: Fall Risk     SUBJECTIVE: Subjective: Pt reports B LE pain    Pain  Pre Treatment Pain Screening  Pain at present: 7  Scale Used: Numeric Score  Intervention List: Patient able to continue with treatment    Post Treatment Pain Screening:   Pain Screening  Patient Currently in Pain: Yes  Pain Assessment  Pain Assessment: 0-10  Pain Level: 7  Pain Type: Acute pain  Pain Location: Leg    Prior Level of Function:  Social/Functional History  Lives With: Alone  Type of Home: House  Home Layout: One level,Laundry in basement (15 steps to basement)  Home Access: Stairs to enter with rails  Entrance Stairs - Number of Steps: 4  Bathroom Shower/Tub: Tub/Shower unit  ADL Assistance: Independent  Homemaking Assistance: Independent  Ambulation Assistance: Independent (No AD)  Transfer Assistance: Independent  Active : No  Patient's  Info: Walks, parents can assist in the summer but leave the state for the winter  Occupation: Full time employment  Type of occupation: [employer]    OBJECTIVE:   Vision: Within Functional Limits  Hearing: Within functional limits    Cognition:  Overall Orientation Status: Impaired  Orientation Level: Oriented to place,Oriented to person,Disoriented to situation,Disoriented to time (Aware month and year)  Follows Commands: Within Functional Limits    Observation/Palpation  Posture: Good  Observation: lethargic, wake with moderal auditory stimulation, increased time for responses, requires encouragement, confusion noted- asked why he was in the hospital    ROM:  RLE AROM: WFL  LLE AROM : WFL    Strength:  Strength RLE  Comment: functionally >/=3+/5  Strength LLE  Comment: functionally >/=3+/5    Neuro:  Balance  Sitting - Static: Good  Sitting - Dynamic: Good  Standing - Static: Good;-  Standing - Dynamic: Good;-     Motor Control  Gross Motor?: WFL  Sensation  Overall Sensation Status: Impaired  Additional Comments: Numbness/tingling in legs/feet    Bed mobility  Rolling to Left: Independent  Rolling to Right: Independent  Supine to Sit: Supervision  Sit to Supine: Supervision  Comment: requires encouragement, supervision d/t confusion and delayed command follow    Transfers  Sit to Stand: Stand by assistance  Stand to sit: Stand by assistance    Ambulation  Ambulation?: Yes  Ambulation 1  Surface: level tile  Device: No Device  Assistance: Stand by assistance  Gait Deviations: Slow Yaneth;Staggers  Distance: 40 ft  Comments: with turns in room environment, pt reports dizziness, BP assessed prior to ambulation 116/66       PT Education  PT Education: Goals;Plan of Care;Transfer Training    ASSESSMENT:   Body structures, Functions, Activity limitations: Decreased functional mobility ; Decreased strength;Decreased balance  Decision Making: Medium Complexity  History: med  Exam: med  Clinical Presentation: med    Prognosis: Good  Barriers to Learning: none    DISCHARGE RECOMMENDATIONS:  Discharge Recommendations: Continue to assess pending progress,Patient would benefit from continued therapy after discharge    Assessment: Pt demonstrates the above deficits and decline in functional mobility status placing them at increased risk for falls. Pt would benefit from physical therapy to address above deficits and allow for safe return home at highest level of function, decrease risk for falls, and improve QOL. REQUIRES PT FOLLOW UP: Yes      PLAN OF CARE:  Plan  Times per week: 2-3 visit follow up  Current Treatment Recommendations: Strengthening,Transfer Training,Balance Training,Endurance Training,Gait Training,Home Exercise Program,Modalities,Positioning,Equipment Evaluation, Education, & procurement,Stair training,Functional Mobility Training,Safety Education & Training,Patient/Caregiver Education & Training,ROM  Safety Devices  Type of devices: Bed alarm in place,Left in bed,Call light within reach    Goals:  Patient goals : to go home  Long term goals  Long term goal 1: Bed mobility with indep  Long term goal 2: Functional transfers with indep  Long term goal 3: Amb 150ft with indep  Long term goal 4: DGI >/=22/24 to demonstrate low fall risk    AMPAC (6 CLICK) BASIC MOBILITY  AM-PAC Inpatient Mobility Raw Score : 18     Therapy Time:   Individual   Time In 1343   Time Out 1355   Minutes Madeleine Heath 90, [address] [facility], 03/16/22 at 2:40 PM     Definitions for assistance levels  Independent = pt does not require any physical supervision or assistance from another person for activity completion. Device may be needed.   Stand by assistance = pt requires verbal cues or instructions from another person, close to but not touching, to perform the activity  Minimal assistance= pt performs 75% or more of the activity; assistance is required to complete the activity  Moderate assistance= pt performs 50% of the activity; assistance is required to complete the activity  Maximal assistance = pt performs 25% of the activity; assistance is required to complete the activity  Dependent = pt requires total physical assistance to accomplish the task

## 2022-03-17 NOTE — PROGRESS NOTES
Pt left AMA. Alert and oriented. AMA form signed. IV's removed.  and Supervisor notified. Per pt friend will pick him up by the emergency room.  Pt wheeled down via wheelchair by supervisor

## 2022-03-17 NOTE — DISCHARGE SUMMARY
Hospital Medicine Discharge Summary    Susan Hunter  :  1985  MRN:  [id number]    Admit date:  3/15/2022  Discharge date:  3/16/22    Admitting Physician:  Minh Pedroza MD  Primary Care Physician:  No primary care provider on file. Discharge Diagnoses:    Principal Problem:    Overdose of opiate or related narcotic, accidental or unintentional, sequela  Resolved Problems:    * No resolved hospital problems. *      Hospital Course:   Susan Hunter is a 40 y.o. male that was admitted and treated at [facility] for unintentional opiate and benzodiazepine overdose. Patient is 60-year-old male who was brought in by EMS to the ED for unintentional overdose he said he took 1 Xanax and some fentanyl he required 8 doses of Narcan in the field along with Narcan infusion however once brought to the general medical floor when opiate effects had worn off the patient left AMA he verbalizes understanding that leaving before treatment would increase his risk of relapse. He verbalizes understanding that he could have an increased risk of death or debility if he leaves  [address] what is do so otherwise he is encouraged to abstain from further drug abuse but this time is hesitant to do so. He denies any suicidal homicidal thoughts he is alert and oriented    Patient was seen by the following consultants while admitted to [facility]:   Consults:  IP CONSULT TO CASE MANAGEMENT  IP CONSULT TO PSYCHIATRY    Physical Exam:   Examination not performed, patient left AMA before physical examination can be completed    Significant Diagnostic Studies:    CT Brain.       Contrast medium:  without contrast..       History:  Altered mental status.       Technical factors: CT imaging of the brain was obtained and formatted as 5 mm contiguous axial images. 2.5 mm contiguous axial images were obtained through the osseous structures.  Sagittal and coronal reconstruction obtained during postprocessing.       Comparison:  None.       Findings:       Extra-axial spaces:  Normal.        Intracranial hemorrhage:  None.       Ventricular system: [Negative.       Basal Cisterns:  Without anomaly.       Cerebral Parenchyma: Without anomaly.       Midline Shift:  None.       Cerebellum:  No anomaly identified.       Paranasal sinuses and mastoid air cells:  Mucosal thickening imaged portion left frontal sinus. Partial opacification bilateral ethmoid sinuses. Near complete opacification of bilateral maxillary sinuses, left greater than right. Sphenoid sinuses patent.    Mastoid air cells well pneumatized bilaterally.       Visualized Orbits:  Negative.           Impression   Impression:       Pansinusitis.           All CT scans at this facility use dose modulation, iterative reconstruction, and/or weight based dosing when appropriate to reduce radiation dose to as low as reasonably achievable. Discharge Medications:      None    Disposition:   AMA    Condition at discharge: Pt was medically stable at the time of discharge.     Activity:   No more IVDU, opiates, or benzodiazepines    Signed:  Deja Prather DO  3/16/2022, 10:07 PM

## 2022-03-18 LAB
ORGANISM: ABNORMAL
URINE CULTURE, ROUTINE: ABNORMAL
URINE CULTURE, ROUTINE: ABNORMAL

## 2023-05-10 ENCOUNTER — HOSPITAL ENCOUNTER (EMERGENCY)
Age: 38
Discharge: HOME | End: 2023-05-10
Attending: EMERGENCY MEDICINE
Payer: MEDICAID

## 2023-05-10 VITALS
OXYGEN SATURATION: 98 % | TEMPERATURE: 98.6 F | SYSTOLIC BLOOD PRESSURE: 126 MMHG | DIASTOLIC BLOOD PRESSURE: 90 MMHG | HEART RATE: 69 BPM | RESPIRATION RATE: 19 BRPM

## 2023-05-10 DIAGNOSIS — T40.601A OPIATE OVERDOSE, ACCIDENTAL OR UNINTENTIONAL, INITIAL ENCOUNTER (HCC): Primary | ICD-10-CM

## 2023-05-10 PROCEDURE — 99283 EMERGENCY DEPT VISIT LOW MDM: CPT

## 2023-05-10 ASSESSMENT — ENCOUNTER SYMPTOMS
CHEST TIGHTNESS: 0
VOMITING: 0
COUGH: 0
DIARRHEA: 0
BLOOD IN STOOL: 0
SORE THROAT: 0
ABDOMINAL PAIN: 0
BACK PAIN: 0
SHORTNESS OF BREATH: 0

## 2023-05-10 ASSESSMENT — PAIN - FUNCTIONAL ASSESSMENT: PAIN_FUNCTIONAL_ASSESSMENT: NONE - DENIES PAIN
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
## 2023-05-10 NOTE — ED NOTES
Pt resting on stretcher, pt denies any needs/wants.  Pt states he is feeling better     Pete Churchill, RN  05/10/23 3727

## 2023-06-09 NOTE — H&P
Hospital Medicine  History and Physical    Patient:  Marylene Messier  MRN: [id number]    CHIEF COMPLAINT:    Chief Complaint   Patient presents with    Drug Overdose       History Obtained From:  patient, electronic medical record  Primary Care Physician: No primary care provider on file. HISTORY OF PRESENT ILLNESS:   The patient is a 40 y.o. male who presents with potential opiate overdose. Patient is a 41-year old male he has a past medical history significant for anxiety and depression he has a history of chronic opiate abuse on apparently had an accidental overdose today when he used fentanyl and Xanax. EMS was called by a friend where he was found curled up in the fetal position unresponsive patient received 8 mg of Narcan IN on arrival patient was brought to the ED where he required Narcan infusion. At this point time he remains awake alert and oriented off of the infusion protecting his airway but remains lethargic. Patient stated he had a 2 mg bar of Xanax and some fentanyl/heroin. Most recent overdose in 6/7/2021. Denies any suicidal homicidal ideation he was following commands protecting his airway but is too lethargic to be discharged and therefore was recommended to be monitored by the medical service today. Past Medical History:  Anxiety and depression    Past Surgical History:  History reviewed. No pertinent surgical history. Medications Prior to Admission: To be reviewed once the patient is more awake alert and oriented able to provide history    Allergies:  Patient has no known allergies. Social History:   TOBACCO:   reports that he has quit smoking. He has never used smokeless tobacco.  ETOH:   reports current alcohol use. OCCUPATION: Patient uses benzodiazepines and opiates. Family History:   History reviewed. No pertinent family history. REVIEW OF SYSTEMS:  Ten systems reviewed and negative except for as above.      Physical Exam:    Vitals: /76   Pulse 83   Temp 98.6 °F (37 °C) (Oral)   Resp 14   SpO2 100%   Constitutional: Sleepy but when aroused alert and oriented  Skin: Skin color, texture, turgor normal. No rashes or lesions track marks in bilateral AC  Eyes:Eye: Normal external eye, conjunctiva, pupils reactive but dilated  ENT: Head: Normocephalic, no lesions, without obvious abnormality. Neck: no adenopathy, no carotid bruit, no JVD, supple, symmetrical, trachea midline and thyroid not enlarged, symmetric, no tenderness/mass/nodules  Respiratory: clear to auscultation bilaterally  Cardiovascular: regular rate and rhythm, S1, S2 normal, no murmur, click, rub or gallop  Gastrointestinal: soft, non-tender; bowel sounds normal; no masses,  no organomegaly  Genitourinary: Deferred  Musculoskeletal:extremities normal, atraumatic, no cyanosis or edema  Neurologic: Mental status AAOx3 No facial asymmetry or droop. Normal muscle strength b/l. No acute motor deficits  Psychiatric: Glum mood and affect. Good insight and judgement  Hematologic: No obvious bruising or bleeding    Recent Labs     03/15/22  2045   WBC 13.4*   HGB 12.7*        Recent Labs     03/15/22  2037 03/15/22  2045   NA  --  140   K  --  4.7   CL  --  102   CO2  --  24   BUN  --  11   CREATININE  --  1.12   GLUCOSE 259 205*   AST  --  28   ALT  --  18   BILITOT  --  <0.2   ALKPHOS  --  97     Troponin T:   Recent Labs     03/15/22  2045   TROPONINI <0.010       ABGs: No results found for: PHART, PO2ART, CUY7BTI  INR: No results for input(s): INR in the last 72 hours. URINALYSIS:  Recent Labs     03/15/22  2045   COLORU Yellow   PHUR 8.5   WBCUA 10-20*   RBCUA 3-5*   BACTERIA MANY*   CLARITYU Clear   SPECGRAV 1.023   LEUKOCYTESUR TRACE*   UROBILINOGEN 1.0   BILIRUBINUR Negative   BLOODU Negative   GLUCOSEU Negative     -----------------------------------------------------------------   No results found. EKG: Sinus tachycardia    Assessment and Plan   1.  Opiate overdose- accidental: Monitoring until effects of been worn off and reevaluate for safety. Consult to lets get real.  Encourage drug cessation. PT and OT evaluation for post overdose ataxia. If Patient is agreeable to assistance with cessation we will start narcotic withdrawal protocol  2. Anxiety and depression resume home regimen once med reconciliation is completed  3.  Lactic acidosis: Resolved    Patient Active Problem List   Diagnosis Code    Overdose of opiate or related narcotic, accidental or unintentional, sequela [address] [facility], DO  Admitting Hospitalist    Emergency Contact: Impaired gait

## 2023-11-16 ENCOUNTER — NURSE TRIAGE (OUTPATIENT)
Dept: OTHER | Facility: CLINIC | Age: 38
End: 2023-11-16

## 2023-11-16 ENCOUNTER — OFFICE VISIT (OUTPATIENT)
Dept: FAMILY MEDICINE CLINIC | Age: 38
End: 2023-11-16
Payer: MEDICAID

## 2023-11-16 VITALS
OXYGEN SATURATION: 96 % | BODY MASS INDEX: 32.78 KG/M2 | DIASTOLIC BLOOD PRESSURE: 64 MMHG | WEIGHT: 204 LBS | HEIGHT: 66 IN | HEART RATE: 70 BPM | SYSTOLIC BLOOD PRESSURE: 100 MMHG

## 2023-11-16 DIAGNOSIS — K21.9 GASTROESOPHAGEAL REFLUX DISEASE, UNSPECIFIED WHETHER ESOPHAGITIS PRESENT: ICD-10-CM

## 2023-11-16 DIAGNOSIS — F17.200 SMOKER: ICD-10-CM

## 2023-11-16 DIAGNOSIS — R13.10 DYSPHAGIA, UNSPECIFIED TYPE: Primary | ICD-10-CM

## 2023-11-16 PROCEDURE — G8427 DOCREV CUR MEDS BY ELIG CLIN: HCPCS | Performed by: FAMILY MEDICINE

## 2023-11-16 PROCEDURE — 99204 OFFICE O/P NEW MOD 45 MIN: CPT | Performed by: FAMILY MEDICINE

## 2023-11-16 PROCEDURE — G8417 CALC BMI ABV UP PARAM F/U: HCPCS | Performed by: FAMILY MEDICINE

## 2023-11-16 PROCEDURE — 4004F PT TOBACCO SCREEN RCVD TLK: CPT | Performed by: FAMILY MEDICINE

## 2023-11-16 PROCEDURE — G8484 FLU IMMUNIZE NO ADMIN: HCPCS | Performed by: FAMILY MEDICINE

## 2023-11-16 RX ORDER — MECOBALAMIN 5000 MCG
15 TABLET,DISINTEGRATING ORAL DAILY
Qty: 30 CAPSULE | Refills: 1 | Status: SHIPPED | OUTPATIENT
Start: 2023-11-16

## 2023-11-16 SDOH — ECONOMIC STABILITY: INCOME INSECURITY: HOW HARD IS IT FOR YOU TO PAY FOR THE VERY BASICS LIKE FOOD, HOUSING, MEDICAL CARE, AND HEATING?: NOT HARD AT ALL

## 2023-11-16 SDOH — ECONOMIC STABILITY: FOOD INSECURITY: WITHIN THE PAST 12 MONTHS, THE FOOD YOU BOUGHT JUST DIDN'T LAST AND YOU DIDN'T HAVE MONEY TO GET MORE.: NEVER TRUE

## 2023-11-16 SDOH — ECONOMIC STABILITY: HOUSING INSECURITY
IN THE LAST 12 MONTHS, WAS THERE A TIME WHEN YOU DID NOT HAVE A STEADY PLACE TO SLEEP OR SLEPT IN A SHELTER (INCLUDING NOW)?: NO

## 2023-11-16 SDOH — ECONOMIC STABILITY: FOOD INSECURITY: WITHIN THE PAST 12 MONTHS, YOU WORRIED THAT YOUR FOOD WOULD RUN OUT BEFORE YOU GOT MONEY TO BUY MORE.: NEVER TRUE

## 2023-11-16 ASSESSMENT — ENCOUNTER SYMPTOMS
SORE THROAT: 0
EYE PAIN: 0
VOICE CHANGE: 0
TROUBLE SWALLOWING: 1
COUGH: 0
DIARRHEA: 0
ABDOMINAL PAIN: 0
SHORTNESS OF BREATH: 0
CONSTIPATION: 0
RHINORRHEA: 0

## 2023-11-16 ASSESSMENT — PATIENT HEALTH QUESTIONNAIRE - PHQ9
1. LITTLE INTEREST OR PLEASURE IN DOING THINGS: 0
SUM OF ALL RESPONSES TO PHQ QUESTIONS 1-9: 0
SUM OF ALL RESPONSES TO PHQ9 QUESTIONS 1 & 2: 0
SUM OF ALL RESPONSES TO PHQ QUESTIONS 1-9: 0
SUM OF ALL RESPONSES TO PHQ QUESTIONS 1-9: 0
2. FEELING DOWN, DEPRESSED OR HOPELESS: 0
SUM OF ALL RESPONSES TO PHQ QUESTIONS 1-9: 0

## 2023-11-16 NOTE — PROGRESS NOTES
distress. Breath sounds: Normal breath sounds. No wheezing. Abdominal:      General: Bowel sounds are normal.      Tenderness: There is no abdominal tenderness. Musculoskeletal:         General: Normal range of motion. Neurological:      Mental Status: He is alert and oriented to person, place, and time. Psychiatric:         Mood and Affect: Mood normal.         Behavior: Behavior normal.         Yojana Bean MD          Please note, this report has been partially produced using speech recognition software and may cause  and /or contain errors related to that system including grammar, punctuation and spelling as well as words and phrases that may seem inappropriate. If there are questions or concerns please feel free to contact me to clarify.

## 2023-11-16 NOTE — TELEPHONE ENCOUNTER
Location of patient: [address] call from [place] at [employer]; Patient with Red Flag Complaint requesting to establish care with [facility]. Girlfriend,Filiberto, is calling, patient is not there, limited triage. Subjective: Caller states \"acid reflux, food gets stuck when he tries to swallow, regurgitating, blood in vomit\" He is a recovering fentanyl addict, 6 months clean. Current Symptoms: See above, reflux occurs after he eats. He will wake in the middle of the night as well. He eats Tums and takes zantac. Has to sleep propped up. He will have burning in the chest, and some lower abdominal pain as well. Onset: 6 months ago; worsening    Associated Symptoms: increased wakefulness    Pain Severity: unable to assess    Temperature: denies fever     What has been tried: Tums, Zantac    LMP: NA Pregnant: NA    Recommended disposition: See in Office Today, writer advised [facility] if unable to obtain a new patient appointment today. Care advice provided, patient verbalizes understanding; denies any other questions or concerns; instructed to call back for any new or worsening symptoms. Patient/Caller agrees with recommended disposition; writer provided warm transfer to [place] at [employer] for appointment scheduling    Attention Provider: Thank you for allowing me to participate in the care of your patient. The patient was connected to triage in response to information provided to the [facility]. Please do not respond through this encounter as the response is not directed to a shared pool.     Reason for Disposition   All other patients with chest pain (Exception: Fleeting chest pain lasting a few seconds.)    Protocols used: Chest Pain-ADULT-[place]
Induction of labor-Medicinal/Internal electronic FM/External electronic FM

## 2025-02-05 NOTE — ED PROVIDER NOTES
[facility] ED  eMERGENCY dEPARTMENT eNCOUnter      Pt Name: Dorys Seymour  MRN: [id number]  [place] 1985  Date of evaluation: 6/7/2021  Provider: Dionne Waterman, [address] [place] [address]       Chief Complaint   Patient presents with    Drug Overdose     Found slumped over in his car unresponsive. Just got out of rehab         HISTORY OF PRESENT ILLNESS   (Location/Symptom, Timing/Onset,Context/Setting, Quality, Duration, Modifying Factors, Severity)  Note limiting factors. Dorys Seymour is a 29 y.o. male who presents to the emergency department with a chief complaint of unresponsiveness. Patient's niece found him slumped over in his car unresponsive after just getting out of rehab for heroin. Per EMS he was breathing approximately 6/min and responded to Narcan. He denies heroin and states that he took pills stating to the ER nurse that he snorted them. He was slightly hypoxic for EMS and placed on nasal cannula to which he responded well. HPI    NursingNotes were reviewed. REVIEW OF SYSTEMS    (2-9 systems for level 4, 10 or more for level 5)     Review of Systems   Unable to perform ROS: Acuity of condition       Except as noted above the remainder of the review of systems was reviewed and negative. PAST MEDICAL HISTORY   History reviewed. No pertinent past medical history. SURGICALHISTORY     History reviewed. No pertinent surgical history. CURRENT MEDICATIONS       Previous Medications    No medications on file       ALLERGIES     Patient has no known allergies. FAMILY HISTORY     History reviewed. No pertinent family history.        SOCIAL HISTORY       Social History     Socioeconomic History    Marital status: Single     Spouse name: None    Number of children: None    Years of education: None    Highest education level: None   Occupational History    None   Tobacco Use    Smoking status: Former Smoker    Smokeless tobacco: Never Used   Vaping Use    Vaping Use: Never used   Substance and Sexual Activity    Alcohol use: Yes     Comment: 7-10 drinks a few times a week    Drug use: Yes     Types: Marijuana, Opiates     Sexual activity: None   Other Topics Concern    None   Social History Narrative    None     Social Determinants of Health     Financial Resource Strain:     Difficulty of Paying Living Expenses:    Food Insecurity:     Worried About Running Out of Food in the Last Year:     Ran Out of Food in the Last Year:    Transportation Needs:     Lack of Transportation (Medical):  Lack of Transportation (Non-Medical):    Physical Activity:     Days of Exercise per Week:     Minutes of Exercise per Session:    Stress:     Feeling of Stress :    Social Connections:     Frequency of Communication with Friends and Family:     Frequency of Social Gatherings with Friends and Family:     Attends Confucianist Services:     Active Member of Clubs or Organizations:     Attends Club or Organization Meetings:     Marital Status:    Intimate Partner Violence:     Fear of Current or Ex-Partner:     Emotionally Abused:     Physically Abused:     Sexually Abused:        SCREENINGS      @FLOW([id number])@      PHYSICAL EXAM    (up to 7 for level 4, 8 or more for level 5)     ED Triage Vitals   BP Temp Temp src Pulse Resp SpO2 Height Weight   -- -- -- -- -- -- -- --       Physical Exam  Constitutional:       Appearance: He is well-developed. Comments: Patient is dazed, looking around as though he is kind of still stoned out. He is able to be verbal but is not really answering questions, when he does he is slow to respond but appropriate   HENT:      Head: Normocephalic and atraumatic. Right Ear: Tympanic membrane normal.      Left Ear: Tympanic membrane normal.      Nose: No congestion. Eyes:      Conjunctiva/sclera: Conjunctivae normal.      Pupils: Pupils are equal, round, and reactive to light.    Cardiovascular:      Rate and Rhythm: Normal rate.      Heart sounds: No murmur heard. No friction rub. No gallop. Pulmonary:      Effort: Pulmonary effort is normal. No respiratory distress. Breath sounds: No wheezing. Abdominal:      General: Bowel sounds are normal. There is no distension. Palpations: Abdomen is soft. Musculoskeletal:         General: Normal range of motion. Cervical back: Normal range of motion and neck supple. No rigidity. Right lower leg: No edema. Left lower leg: No edema. Skin:     General: Skin is warm and dry. Coloration: Skin is not jaundiced or pale. Findings: No bruising. Neurological:      Mental Status: He is alert and oriented to person, place, and time. Motor: No weakness. Deep Tendon Reflexes: Reflexes are normal and symmetric. DIAGNOSTIC RESULTS     EKG: All EKG's are interpreted by the Emergency Department Physician who either signs or Co-signsthis chart in the absence of a cardiologist.        RADIOLOGY:   [place] such as CT, Ultrasound and MRI are read by the radiologist. Plain radiographic images are visualized and preliminarily interpreted by the emergency physician with the below findings:        Interpretation per the Radiologist below, if available at the time ofthis note:    No orders to display         ED BEDSIDE ULTRASOUND:   Performed by ED Physician - none    LABS:  Labs Reviewed - No data to display    All other labs were within normal range or not returned as of this dictation. EMERGENCY DEPARTMENT COURSE and DIFFERENTIAL DIAGNOSIS/MDM:   Vitals:    Vitals:    06/07/21 2000 06/07/21 2030 06/07/21 2100 06/07/21 2130   BP: 106/63 104/69 (!) 92/55 94/74   Pulse: 103 82 69 74   Resp: 15 15 14 14   Temp:       TempSrc:       SpO2: 91% 94% 91% 95%   Weight:       Height:           Patient sleeps for a while and then wakes up. He remained stable with his oxygenation and is having trouble piecing together the events of the evening.   I posed to him that he can either call for a ride or we can call to get him help again. He is not wanting to engage with me and just sort of staring at the TV though he is able to answer questions when I press him. I give him a little time to think through his life choices. Patient continues to be noncommittal so I process his discharge and give him a pamphlet for \"let's get real\"  MDM    CRITICAL CARE TIME   Total Critical Care time was 0 minutes, excluding separately reportableprocedures. There was a high probability of clinicallysignificant/life threatening deterioration in the patient's condition which required my urgent intervention. CONSULTS:  None    PROCEDURES:  Unless otherwise noted below, none     Procedures    FINAL IMPRESSION      1. Narcotic overdose, accidental or unintentional, initial encounter ([facility] [address]    2. Opiate overdose, accidental or unintentional, initial encounter [facility])          DISPOSITION/PLAN   DISPOSITION Decision To Discharge 06/07/2021 09:45:08 PM      PATIENT REFERRED TO:    when you are ready to get help, all you have to do is reach out.           DISCHARGE MEDICATIONS:  New Prescriptions    No medications on file          (Please note that portions of this note were completed with a voice recognition program.  Efforts were made to edit the dictations but occasionally words are mis-transcribed.)    Akiko Coronado DO (electronically signed)  Attending Emergency Physician          Akiko Coronado DO  06/07/21 5092 No